# Patient Record
Sex: MALE | Race: WHITE | Employment: FULL TIME | URBAN - METROPOLITAN AREA
[De-identification: names, ages, dates, MRNs, and addresses within clinical notes are randomized per-mention and may not be internally consistent; named-entity substitution may affect disease eponyms.]

---

## 2019-05-29 ENCOUNTER — HOSPITAL ENCOUNTER (EMERGENCY)
Facility: CLINIC | Age: 24
Discharge: HOME | End: 2019-05-29
Attending: PHYSICIAN ASSISTANT | Admitting: PHYSICIAN ASSISTANT
Payer: OTHER MISCELLANEOUS

## 2019-05-29 VITALS
HEART RATE: 63 BPM | TEMPERATURE: 98.4 F | SYSTOLIC BLOOD PRESSURE: 158 MMHG | WEIGHT: 186 LBS | BODY MASS INDEX: 23.13 KG/M2 | RESPIRATION RATE: 16 BRPM | HEIGHT: 75 IN | DIASTOLIC BLOOD PRESSURE: 93 MMHG | OXYGEN SATURATION: 99 %
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
## 2019-05-29 LAB
HIV 1+2 AB+HIV1P24 AG SERPLBLD IA.RAPID: NONREACTIVE
HIV 1+2 AB+HIV1P24 AG SERPLBLD IA.RAPID: NONREACTIVE
HIV 1+2 AB+HIV1P24 AG SERPLBLD IA.RAPID: NORMAL
HIV 1+2 AB+HIV1P24 AG SERPLBLD IA.RAPID: NORMAL

## 2019-05-29 PROCEDURE — 87536 HIV-1 QUANT&REVRSE TRNSCRPJ: CPT | Performed by: PHYSICIAN ASSISTANT

## 2019-05-29 PROCEDURE — 87806 HIV AG W/HIV1&2 ANTB W/OPTIC: CPT | Performed by: PHYSICIAN ASSISTANT

## 2019-05-29 PROCEDURE — 86803 HEPATITIS C AB TEST: CPT | Performed by: PHYSICIAN ASSISTANT

## 2019-05-29 PROCEDURE — 36415 COLL VENOUS BLD VENIPUNCTURE: CPT | Performed by: PHYSICIAN ASSISTANT

## 2019-05-29 PROCEDURE — 87389 HIV-1 AG W/HIV-1&-2 AB AG IA: CPT | Performed by: PHYSICIAN ASSISTANT

## 2019-05-29 PROCEDURE — 87340 HEPATITIS B SURFACE AG IA: CPT | Performed by: PHYSICIAN ASSISTANT

## 2019-05-29 PROCEDURE — 86704 HEP B CORE ANTIBODY TOTAL: CPT | Performed by: PHYSICIAN ASSISTANT

## 2019-05-29 PROCEDURE — 87522 HEPATITIS C REVRS TRNSCRPJ: CPT | Performed by: PHYSICIAN ASSISTANT

## 2019-05-29 PROCEDURE — 86706 HEP B SURFACE ANTIBODY: CPT | Performed by: PHYSICIAN ASSISTANT

## 2019-05-29 PROCEDURE — 99283 EMERGENCY DEPT VISIT LOW MDM: CPT

## 2019-05-29 RX ORDER — LEVOTHYROXINE SODIUM 125 UG/1
125 TABLET ORAL DAILY
COMMUNITY

## 2019-05-29 ASSESSMENT — ENCOUNTER SYMPTOMS: WOUND: 1

## 2019-05-29 ASSESSMENT — MIFFLIN-ST. JEOR: SCORE: 1924.32

## 2019-05-29 NOTE — ED AVS SNAPSHOT
Emergency Department  [address] [place] [id number]  Phone:  [phone]  Fax:  [phone]                                    Cabrera Lund   MRN: [id number]    Department:   Emergency Department   Date of Visit:  5/29/2019           After Visit Summary Signature Page    I have received my discharge instructions, and my questions have been answered. I have discussed any challenges I see with this plan with the nurse or doctor.    ..........................................................................................................................................  Patient/Patient Representative Signature      ..........................................................................................................................................  Patient Representative Print Name and Relationship to Patient    ..................................................               ................................................  Date                                   Time    ..........................................................................................................................................  Reviewed by Signature/Title    ...................................................              ..............................................  Date                                               Time          22EPIC Rev 08/18

## 2019-05-29 NOTE — DISCHARGE INSTRUCTIONS
Discharge Instructions  Body Fluid Exposure  You have been exposed to blood or other body fluids (such as saliva) from another person.   The risk associated with this exposure will depend on the nature of the exposure and whether the person has any known serious infections such as HIV, Hepatitis B or Hepatitis C.  Even if you are exposed to HIV, Hepatitis B or Hepatitis C, the risk is low and depends on the type of exposure.   MOST EXPOSURES DO NOT REQUIRE TREATMENT    Contact with blood, tissue, or other body fluids from an infected person can be risky if it involves:  A needle-stick or a sharp object that breaks the skin.  Contact with the pink, moist tissues called ?mucous membranes? that line your mouth, nose, eyes, and other body parts.  Contact with parts of your skin that have cuts or scrapes that are still bleeding.  Contact with concentrated forms of a virus, such as you would find in a research lab.  Body fluid that contacts unbroken skin will not cause disease.  Even if you have a risky exposure to an infected person, your chances of developing disease may be very low.  For example, risk of transmission of HIV from a contaminated needle stick from an HIV positive patient is estimated to be only 3 times out of 1000 needle sticks. In the [place], there have only been 57 documented cases of HIV transmission to healthcare workers in an occupational setting, and none since 1999.  Today, you discussed with your provider the type of exposure you have had.  Your treatment today may have included any or all of the following:  Baseline blood testing of you.  Pregnancy test if you are a woman of child bearing age.  Blood testing or research regarding the person to whose fluids you were exposed (the source).  Discussion and prescription or administration of medications to prevent the development of HIV or Hepatitis B.  Vaccination to Hepatitis B (you will need additional vaccine doses 30 days and 6 months after your first  dose) or testing of effectiveness of your previous Hepatitis B vaccine.  If you were definitely or possibly exposed to HIV, the provider has discussed the option of taking medicines to reduce the chance that you will get infected with HIV. These are the same medicines that are used to treat HIV. If you decide to take these medicines, you will need to start them as soon as possible and take them for a month.   If you were definitely or possibly exposed to Hepatitis B, the provider has asked you if and when you had the Hepatitis B vaccine. The treatment you need will depend on whether you had the vaccine and on how your body responded to it.  If you were definitely or possibly exposed to Hepatitis C, it's important for you to watch closely for signs of infection. There is no vaccine or medicine to prevent Hepatitis C infection. If you become infected with Hepatitis C, there is a good chance your body will fight off the infection on its own. But if not, rapid treatment with certain medicines can cure most Hepatitis C infections when they are new.  Your follow up should be with your regular provider and if this exposure occurred at work, your Occupational Health Department.  You will need follow up and retesting at 6 weeks, 3 months and 6 months.  If any of your tests become positive, at follow up, you will be advised of treatment options available to you.  If you are exposed to a body fluid in the future:  Wash the area that was exposed with soap and water. If you have any cuts or scrapes on your skin, you should also use alcohol or another disinfectant on those parts of your skin after you are done washing.   If you got blood or body fluids in your mouth or nose, you should rinse with lots and lots of water. If you got blood or body fluids in your eyes, you should rinse with water or a saltwater solution called ?saline.?   If your exposure involved something that broke the skin, such as a needle, you should wash with  soap and water and use a disinfectant. But you should not squeeze or pinch the area to try to get anything out.     If you were given a prescription for medicine here today, be sure to read all of the information (including the package insert) that comes with your prescription.  This will include important information about the medicine, its side effects, and any warnings that you need to know about.  The pharmacist who fills the prescription can provide more information and answer questions you may have about the medicine.  If you have questions or concerns that the pharmacist cannot address, please call or return to the Emergency Department.    Remember that you can always come back to the Emergency Department if you are not able to see your regular provider in the amount of time listed above, if you get any new symptoms, or if there is anything that worries you.

## 2019-05-29 NOTE — ED PROVIDER NOTES
"  History     Chief Complaint:  Body Fluid Exposure      HPI   Cabrera Lund is an otherwise healthy 24 year old male who presents to the ED for evaluation of a body fluid exposure. The patient reports that he assisting with a retinal surgery today while working at the [employer], when he was poked by a dirty needle. The surgeon was reportedly using a hypodermic needle to suture, when the needle suddenly slipped and poked the patient in the distal right thumb. The individual is known to the patient and had no medical history of note, but the patient was referred to the ED for lab workup. He did wash the wound prior to arrival and has no other complaints here. Of note, the patient does have a PCP he can follow-up with.     Allergies:  No known drug allergies.     Medications:    Levothyroxine    Past Medical History:    Thyroid disease    Past Surgical History:    History reviewed. No pertinent past surgical history.     Family History:    History reviewed. No pertinent family history.     Social History:  The patient works at the [employer].  The injury occurred while at work     Review of Systems   Skin: Positive for wound.   All other systems reviewed and are negative.      Physical Exam     Patient Vitals for the past 24 hrs:   BP Temp Temp src Pulse Heart Rate Resp SpO2 Height Weight   05/29/19 1652 (!) 158/93 -- -- 63 -- -- 99 % -- --   05/29/19 1344 146/83 98.4  F (36.9  C) Oral -- 69 16 99 % 1.905 m (6' 3\") 84.4 kg (186 lb)          Physical Exam  Constitutional: Alert, attentive  CV: 2+ radial pulse, brisk distal cap refill  Pulm: No respiratory distress  MSK: Full ROM of right thumb.   Neurological: Alert, attentive  5/5 strength to the right thumb motor functions; sensation intact.   Skin: Skin is warm and dry. Pinpoint puncture wound to the right thumb pad. No active bleeding.   Psych: Normal mood and affect     Emergency Department Course     Laboratory:  Hepatitis " C RNA: pending  Hepatitis C antibody: pending  Hepatitis B surface antibody: pending  Hepatitis B surface antigen: pending  Hepatitis B core antibody: pending  HIV antigen antibody combo: pending  HIV-1 RNA: pending  Rapid HIV 1 and 2 Antigen Antibody: pending    Emergency Department Course:  Nursing notes and vitals reviewed. (1680) I performed an exam of the patient as documented above.     Findings and plan explained to the Patient. Patient discharged home with instructions regarding supportive care, medications, and reasons to return. The importance of close follow-up was reviewed.     I personally reviewed the need for follow-up with the Patient and answered all related questions prior to discharge.    (1522) I spoke with the laboratory regarding the results of the patient's blood work, which was non-reactive. I went to inform the patient but found he has already left the department, so nursing staff notified him by phone.     Impression & Plan      Medical Decision Making:  Cabrera Lund is a 24 year old male who presents for evaluation of a body fluid exposure after an accidental needlestick.  The exposure was not high risk as the individual was known to the patient and otherwise healthy. Based on clinical scenario I recommended source testing, initial testing of patient for hepatitis B and C and HIV.  I would not initiate post exposure prophylaxis based on this exposure and discussed risks in depth with patient.  Will follow up with PMD.  Patient agrees with the plan and all questions and concerns addressed prior to discharge home.    Diagnosis:    ICD-10-CM    1. Accidental needlestick injury with exposure to body fluid Z77.21 Hepatitis B Surface Antibody    W27.3XXA Hepatitis B surface antigen     Hepatitis B core antibody     Hepatitis C antibody     Hepatitis C RNA quantitative     HIV Antigen Antibody Combo     HIV-1 RNA quantitative     HIV-1 RNA quantitative     Rapid HIV 1 and 2 Antigen Antibody      Rapid HIV 1 and 2 Antigen Antibody       Disposition:  discharged to home      Scribe Disclosure:  I, Odalys Aguirre, am serving as a scribe on 5/29/2019 at 3:30 PM to personally document services performed by Cee Valdes PA-C based on my observations and the provider's statements to me.      Odalys Aguirre  5/29/2019    EMERGENCY DEPARTMENT       Cee Valdes PA-C  05/29/19 2032

## 2019-05-30 ENCOUNTER — TELEPHONE (OUTPATIENT)
Dept: EMERGENCY MEDICINE | Facility: CLINIC | Age: 24
End: 2019-05-30

## 2019-05-30 LAB
HBV CORE AB SERPL QL IA: NONREACTIVE
HBV SURFACE AB SERPL IA-ACNC: 28.99 M[IU]/ML
HBV SURFACE AG SERPL QL IA: NONREACTIVE
HCV AB SERPL QL IA: NONREACTIVE
HCV RNA SERPL NAA+PROBE-ACNC: NORMAL [IU]/ML
HCV RNA SERPL NAA+PROBE-LOG IU: NORMAL LOG IU/ML
HIV 1+2 AB+HIV1 P24 AG SERPL QL IA: NONREACTIVE

## 2019-05-30 NOTE — RESULT ENCOUNTER NOTE
The blood and body fluid lab result for Hepatitis B surface antibody from a recent exposure is POSITIVE (reactive).  Patient to be notified of result and advised per [facility] ED lab result protocol     [If Hepatitis B surface ANTIBODY (juan) is reactive/positive, this indicates Patient has immunity]  3 shot vaccination series in 1996.

## 2019-05-30 NOTE — RESULT ENCOUNTER NOTE
The blood and body fluid lab result for Hepatitis C antibody (juan) from a recent exposure is NEGATIVE (nonreactive).  Patient to be notified of result and advised per [facility] ED lab result protocol

## 2019-05-30 NOTE — RESULT ENCOUNTER NOTE
The blood and body fluid lab result for HIV Antigen Antibody combo from a recent exposure was NEGATIVE (nonreactive).  Patient to be notified of result and advised per [facility] ED lab result protocol

## 2019-05-30 NOTE — TELEPHONE ENCOUNTER
[facility] Emergency Department Lab result notification:    Lab Result  The following blood and bodily fluid lab results from a recent exposure were all negative (nonreactive) for:     Hepatitis C antibody     Hepatitis C RNA quantitative    Hepatitis B surface antigen (agn)    Hepatitis B Core antibody    HIV Antigen Antibody Combo.     The following blood and body fluid lab result from a recent exposure were Positive (reactive) for:               Hepatitis B surface antibody (juan)     Patient to be notified of result and advised per [facility] ED lab result protocol  [Note: If Hepatitis B surface ANTIBODY (juan) is reactive/positive, this indicates Patient has immunity]    ED visit Date: 5/30/19  Symptoms reported at ED visit Cabrera Lund is a 24 year old male who presents for evaluation of a body fluid exposure after an accidental needlestick.  The exposure was not high risk as the individual was known to the patient and otherwise healthy. Based on clinical scenario I recommended source testing, initial testing of patient for hepatitis B and C and HIV.  I would not initiate post exposure prophylaxis based on this exposure and discussed risks in depth with patient.  Will follow up with PMD.  Patient agrees with the plan and all questions and concerns addressed prior to discharge home.     Diagnosis:      ICD-10-CM     1. Accidental needlestick injury with exposure to body fluid        Miscellaneous information      Waiting for the below last test result before calling patient.   HIV-1 RNA quantitative [[id number]] (Order [id number])     Belen Mccollum RN  [facility] RN  Lung Nodule and ED Lab Result F/u RN  Epic pool (ED late result f/u RN): [id number]  # [phone]

## 2019-05-30 NOTE — RESULT ENCOUNTER NOTE
The blood and body fluid lab result for Hepatitis B surface antigen (agn) from a recent exposure is negative (nonreactive).  Patient to be notified of result and advised per [facility] ED lab result protocol

## 2019-05-30 NOTE — RESULT ENCOUNTER NOTE
The blood and body fluid lab result for Hepatitis C RNA quantitative from a recent exposure is negative (nonreactive).  Patient to be notified of result and advised per [facility] ED lab result protocol

## 2019-05-30 NOTE — RESULT ENCOUNTER NOTE
The blood and body fluid lab result for Hepatitis B core antibody (juan) from a recent exposure is negative (nonreactive).  Patient to be notified of result and advised per [place] ED lab result protocol

## 2019-05-31 LAB
HIV1 RNA # PLAS NAA DL=20: NORMAL {COPIES}/ML
HIV1 RNA SERPL NAA+PROBE-LOG#: NORMAL {LOG_COPIES}/ML

## 2019-05-31 NOTE — TELEPHONE ENCOUNTER
[facility] Emergency Department Lab result notification:    [place] ED lab result protocol used  Blood and Body Fluid Exposure    Reason for call  Notify of lab results, assess symptoms,  review ED providers recommendations/discharge instructions (if necessary) and advise per ED lab result f/u protocol    Lab Result   The following blood and bodily fluid lab results from a recent exposure were all negative (nonreactive) for:     Hepatitis C antibody     Hepatitis C RNA quantitative    Hepatitis B surface antigen (agn)    Hepatitis B Core antibody    HIV Antigen Antibody Combo.     HIV-1 RNA Quantitative    Rapid HIV 1 & 2 antigen juan    The following blood and body fluid lab result from a recent exposure were Positive (reactive) for:   Hepatitis B surface antibody (juan)    Patient to be notified of result and advised per [place] ED lab result protocol  [Note: If Hepatitis B surface ANTIBODY (juan) is reactive/positive, this indicates Patient has immunity]    Information table from ED Provider visit on 5/29/19  Symptoms reported at ED visit (Chief complaint, HPI) Cabrera Lund is an otherwise healthy 24 year old male who presents to the ED for evaluation of a body fluid exposure. The patient reports that he assisting with a retinal surgery today while working at the [employer], when he was poked by a dirty needle. The surgeon was reportedly using a hypodermic needle to suture, when the needle suddenly slipped and poked the patient in the distal right thumb. The individual is known to the patient and had no medical history of note, but the patient was referred to the ED for lab workup. He did wash the wound prior to arrival and has no other complaints here. Of note, the patient does have a PCP he can follow-up with.         ED providers Impression and Plan (applicable information) Cabrera Lund is a 24 year old male who presents for evaluation of a body fluid exposure after an  accidental needlestick.  The exposure was not high risk as the individual was known to the patient and otherwise healthy. Based on clinical scenario I recommended source testing, initial testing of patient for hepatitis B and C and HIV.  I would not initiate post exposure prophylaxis based on this exposure and discussed risks in depth with patient.  Will follow up with PMD.  Patient agrees with the plan and all questions and concerns addressed prior to discharge home.     Miscellaneous information N/A     RN Assessment (Patient s current Symptoms), include time called.  [Insert Left message here if message left]  Advised of results.  RN Recommendations/Instructions per [place] ED lab result protocol  Source patient was tested per Cabrera.  To follow workplace protocol for re testing, general re testing guidelines reviewed.    Please Contact your PCP clinic or return to the Emergency department if your:    Symptoms return.    Symptoms worsen or other concerning symptom's.    PCP follow-up Questions asked: YES       Lainey Patel RN    [facility] RN  Lung Nodule and ED Lab Results F/U RN  Epic pool (ED late result f/u RN) : [id number]   # [phone]

## 2021-01-19 ENCOUNTER — AMBULATORY - HEALTHEAST (OUTPATIENT)
Dept: NURSING | Facility: CLINIC | Age: 26
End: 2021-01-19

## 2021-02-09 ENCOUNTER — AMBULATORY - HEALTHEAST (OUTPATIENT)
Dept: NURSING | Facility: CLINIC | Age: 26
End: 2021-02-09

## 2021-03-06 ENCOUNTER — HEALTH MAINTENANCE LETTER (OUTPATIENT)
Age: 26
End: 2021-03-06

## 2021-10-09 ENCOUNTER — HEALTH MAINTENANCE LETTER (OUTPATIENT)
Age: 26
End: 2021-10-09

## 2021-11-08 ENCOUNTER — LAB REQUISITION (OUTPATIENT)
Dept: LAB | Facility: CLINIC | Age: 26
End: 2021-11-08

## 2021-11-08 LAB
HBV SURFACE AB SERPL IA-ACNC: 27.69 M[IU]/ML
HCV AB SERPL QL IA: NONREACTIVE
HIV 1+2 AB+HIV1 P24 AG SERPL QL IA: NONREACTIVE

## 2021-11-08 PROCEDURE — 87389 HIV-1 AG W/HIV-1&-2 AB AG IA: CPT | Performed by: INTERNAL MEDICINE

## 2021-11-08 PROCEDURE — 86803 HEPATITIS C AB TEST: CPT | Performed by: INTERNAL MEDICINE

## 2021-11-08 PROCEDURE — 86706 HEP B SURFACE ANTIBODY: CPT | Performed by: INTERNAL MEDICINE

## 2022-03-20 ENCOUNTER — HEALTH MAINTENANCE LETTER (OUTPATIENT)
Age: 27
End: 2022-03-20

## 2022-09-11 ENCOUNTER — HEALTH MAINTENANCE LETTER (OUTPATIENT)
Age: 27
End: 2022-09-11

## 2023-05-06 ENCOUNTER — HEALTH MAINTENANCE LETTER (OUTPATIENT)
Age: 28
End: 2023-05-06